# Patient Record
Sex: FEMALE | Race: WHITE | Employment: OTHER | URBAN - METROPOLITAN AREA
[De-identification: names, ages, dates, MRNs, and addresses within clinical notes are randomized per-mention and may not be internally consistent; named-entity substitution may affect disease eponyms.]

---

## 2022-08-15 ENCOUNTER — OFFICE VISIT (OUTPATIENT)
Dept: FAMILY MEDICINE CLINIC | Age: 82
End: 2022-08-15
Payer: COMMERCIAL

## 2022-08-15 VITALS
HEIGHT: 67 IN | DIASTOLIC BLOOD PRESSURE: 96 MMHG | SYSTOLIC BLOOD PRESSURE: 185 MMHG | WEIGHT: 188.13 LBS | HEART RATE: 70 BPM | BODY MASS INDEX: 29.53 KG/M2 | RESPIRATION RATE: 18 BRPM | OXYGEN SATURATION: 99 % | TEMPERATURE: 97.2 F

## 2022-08-15 DIAGNOSIS — Z01.84 IMMUNITY STATUS TESTING: ICD-10-CM

## 2022-08-15 DIAGNOSIS — Z76.89 ENCOUNTER TO ESTABLISH CARE: Primary | ICD-10-CM

## 2022-08-15 PROCEDURE — 99203 OFFICE O/P NEW LOW 30 MIN: CPT | Performed by: NURSE PRACTITIONER

## 2022-08-15 PROCEDURE — 36415 COLL VENOUS BLD VENIPUNCTURE: CPT | Performed by: NURSE PRACTITIONER

## 2022-08-15 PROCEDURE — 1123F ACP DISCUSS/DSCN MKR DOCD: CPT | Performed by: NURSE PRACTITIONER

## 2022-08-15 NOTE — LETTER
9/26/2022 12:12 PM    Ms. Murray Tejeda has a positive result for her SARS-CoV-2 antibody testing which could mean she previously had a SARS-CoV-2 infection or COVID-19.        Sincerely,      Lynsey Sanchez NP

## 2022-08-15 NOTE — PROGRESS NOTES
Briana Buckley is a 80 y.o. female who presents today with c/o   Chief Complaint   Patient presents with    Establish Care           Assessment/ Plan:         ICD-10-CM ICD-9-CM    1. Encounter to establish care  Z76.89 V65.8       2. Immunity status testing  Z01.84 V72.61 SARS-COV-2 AB, IGG        I have asked her to follow up in the office if needed. She does not currently have any health problems that she needs managed. Orders Placed This Encounter    SARS-COV-2 AB, IGG ([facility] Default)     Scheduling Instructions:      CDC does not recommend using antibody testing to diagnose acute infection. It is recommended to use a viral antigen test to diagnose acute infection. See order NOVEL CORONAVIRUS (COVID-19) - [id number]. Antibody tests are not 100% accurate, and some false-positive results or false-negative results may occur. A positive result may not ensure immunity from reinfection. Per CDC, results do not confirm whether or not a patient is able to spread the virus that causes COVID-19. Order Specific Question:   Is this test for diagnosis or screening? Answer:   Screening     Order Specific Question:   Symptomatic for COVID-19 as defined by CDC? Answer:   No     Order Specific Question:   Hospitalized for COVID-19? Answer:   No     Order Specific Question:   Admitted to ICU for COVID-19? Answer:   No     Order Specific Question:   Employed in healthcare setting? Answer:   No     Order Specific Question:   Resident in a congregate (group) care setting? Answer:   No     Order Specific Question:   Pregnant? Answer:   No     Order Specific Question:   Previously tested for COVID-19? Answer:   No       Follow-up and Dispositions    Return if symptoms worsen or fail to improve. Subjective:  Briana Buckley is a 80 y.o. female here today to establish care. She has no concerns today. She is very healthy.  She plans to travel to [facility] this October and would like to have the covid antibody test completed prior to her trip. She has not been vaccinated. She believes she may have had covid a few years ago, but this was prior to the covid testing. Today, she feels well. We will complete the covid antibody test.     There is no problem list on file for this patient. History reviewed. No pertinent past medical history. No current outpatient medications on file. No Known Allergies    Past Surgical History:   Procedure Laterality Date    HX APPENDECTOMY      HX BUNIONECTOMY Bilateral     HX HIP REPLACEMENT Left     HX HIP REPLACEMENT Left     HX ORTHOPAEDIC Right 2012    knee arthroscopy       Social History     Tobacco Use   Smoking Status Former    Packs/day: 0.50    Years: 11.00    Pack years: 5.50    Types: Cigarettes    Quit date: 65    Years since quittin.6   Smokeless Tobacco Never       Social History     Socioeconomic History    Marital status:     Number of children: 0   Occupational History    Occupation: xray tech   Tobacco Use    Smoking status: Former     Packs/day: 0.50     Years: 11.00     Pack years: 5.50     Types: Cigarettes     Quit date: 1974     Years since quittin.6    Smokeless tobacco: Never   Substance and Sexual Activity    Alcohol use: Yes     Comment: \"socially\"    Drug use: No    Sexual activity: Not Currently       Family History   Problem Relation Age of Onset    Stroke Mother        ROS:  Review of Systems   Constitutional:  Negative for chills, fever and weight loss. HENT:  Negative for hearing loss and tinnitus. Eyes:  Negative for blurred vision, double vision and photophobia. Respiratory:  Negative for cough. Cardiovascular:  Negative for chest pain and palpitations. Gastrointestinal:  Negative for abdominal pain, heartburn, nausea and vomiting. Genitourinary:  Negative for dysuria and urgency. Musculoskeletal:  Negative for myalgias. Skin:  Negative for itching and rash. Neurological:  Negative for dizziness and headaches. Psychiatric/Behavioral:  Negative for depression and suicidal ideas. Objective:     Visit Vitals  BP (!) 185/96 (BP 1 Location: Left upper arm)   Pulse 70   Temp 97.2 °F (36.2 °C) (Temporal)   Resp 18   Ht 5' 6.93\" (1.7 m)   Wt 188 lb 2 oz (85.3 kg)   SpO2 99%   BMI 29.53 kg/m²     Body mass index is 29.53 kg/m². Physical Exam  Vitals reviewed. Constitutional:       General: She is not in acute distress. Appearance: She is not ill-appearing or toxic-appearing. HENT:      Head: Normocephalic. Right Ear: External ear normal.      Left Ear: External ear normal.      Nose: Nose normal.      Mouth/Throat:      Mouth: Mucous membranes are moist.   Eyes:      Pupils: Pupils are equal, round, and reactive to light. Cardiovascular:      Rate and Rhythm: Normal rate. Pulses: Normal pulses. Pulmonary:      Effort: Pulmonary effort is normal.   Abdominal:      General: Abdomen is flat. Musculoskeletal:         General: Normal range of motion. Cervical back: Normal range of motion. Skin:     General: Skin is warm. Neurological:      Mental Status: She is alert and oriented to person, place, and time. Psychiatric:         Mood and Affect: Mood normal.         Behavior: Behavior normal.         Thought Content: Thought content normal.       No results found for this or any previous visit. No results found for this visit on 08/15/22. Verbal and written instructions (see AVS) provided. Patient expresses understanding of diagnosis and treatment plan. Follow-up and Dispositions    Return if symptoms worsen or fail to improve. Patient has been advised to contact practice or seek care if condition persists or worsens.      Corinna Draper NP

## 2022-08-15 NOTE — PROGRESS NOTES
Labs drawn in left arm per Meli's orders. Pt tolerated well. 1. \"Have you been to the ER, urgent care clinic since your last visit? Hospitalized since your last visit? \" No    2. \"Have you seen or consulted any other health care providers outside of the [address] [place] [address] since your last visit? \" No     3. For patients aged 39-70: Has the patient had a colonoscopy / FIT/ Cologuard? Yes - no Care Gap present      If the patient is female:    4. For patients aged 41-77: Has the patient had a mammogram within the past 2 years? Yes - no Care Gap present      5. For patients aged 21-65: Has the patient had a pap smear?  NA - based on age or sex

## 2022-08-17 LAB
SARS-COV-2 SEMI-QUANT IGG AB: 260 AU/ML
SARS-COV-2 SPIKE AB, INTERP - CVSQ1M: POSITIVE